# Patient Record
Sex: FEMALE | Race: WHITE | Employment: OTHER | URBAN - METROPOLITAN AREA
[De-identification: names, ages, dates, MRNs, and addresses within clinical notes are randomized per-mention and may not be internally consistent; named-entity substitution may affect disease eponyms.]

---

## 2020-03-12 ENCOUNTER — HOSPITAL ENCOUNTER (EMERGENCY)
Facility: HOSPITAL | Age: 65
Discharge: HOME | End: 2020-03-12
Attending: EMERGENCY MEDICINE
Payer: MEDICARE

## 2020-03-12 VITALS
OXYGEN SATURATION: 94 % | DIASTOLIC BLOOD PRESSURE: 89 MMHG | HEART RATE: 86 BPM | SYSTOLIC BLOOD PRESSURE: 152 MMHG | HEIGHT: 60 IN | TEMPERATURE: 99 F | RESPIRATION RATE: 18 BRPM | BODY MASS INDEX: 32.89 KG/M2 | WEIGHT: 167.56 LBS

## 2020-03-12 DIAGNOSIS — R89.9 ABNORMAL LABORATORY TEST: Primary | ICD-10-CM

## 2020-03-12 LAB
ANION GAP SERPL CALC-SCNC: 6 MMOL/L (ref 0–18)
BUN BLD-MCNC: 14 MG/DL (ref 7–18)
BUN/CREAT SERPL: 12 (ref 10–20)
CALCIUM BLD-MCNC: 9.5 MG/DL (ref 8.5–10.1)
CHLORIDE SERPL-SCNC: 103 MMOL/L (ref 98–112)
CO2 SERPL-SCNC: 24 MMOL/L (ref 21–32)
CREAT BLD-MCNC: 1.17 MG/DL (ref 0.55–1.02)
GLUCOSE BLD-MCNC: 116 MG/DL (ref 70–99)
OSMOLALITY SERPL CALC.SUM OF ELEC: 277 MOSM/KG (ref 275–295)
POTASSIUM SERPL-SCNC: 3.8 MMOL/L (ref 3.5–5.1)
SODIUM SERPL-SCNC: 133 MMOL/L (ref 136–145)

## 2020-03-12 PROCEDURE — 36415 COLL VENOUS BLD VENIPUNCTURE: CPT

## 2020-03-12 PROCEDURE — 99283 EMERGENCY DEPT VISIT LOW MDM: CPT

## 2020-03-12 PROCEDURE — 80048 BASIC METABOLIC PNL TOTAL CA: CPT | Performed by: EMERGENCY MEDICINE

## 2020-03-12 PROCEDURE — 84132 ASSAY OF SERUM POTASSIUM: CPT | Performed by: EMERGENCY MEDICINE

## 2020-03-12 NOTE — ED PROVIDER NOTES
Patient Seen in: [facility] Emergency Department      History   Patient presents with:  Abnormal Result    Stated Complaint: elevated potassium on outpatient labs    HPI    CHIEF COMPLAINT: Elevated potassium    HISTORY OF PRESENT ILLNESS: Patient i Vitals [03/12/20 0827]   /89   Pulse 108   Resp 18   Temp 98.7 °F (37.1 °C)   Temp src Temporal   SpO2 96 %   O2 Device None (Room air)       Current:/89   Pulse 108   Temp 98.7 °F (37.1 °C) (Temporal)   Resp 18   Ht 152.4 cm (5')   Wt 76 kg to the ER for worsening, concerning, new, changing or persisting symptoms. Patient was screened and evaluated during this visit.    I determined, within reasonable clinical confidence and prior to discharge, that an emergency medical condition was not or

## 2020-03-12 NOTE — ED INITIAL ASSESSMENT (HPI)
Patient sts had blood draw this morning and her potassium level came back elevated. Denies symptoms.

## 2021-11-02 ENCOUNTER — HOSPITAL ENCOUNTER (EMERGENCY)
Facility: HOSPITAL | Age: 66
Discharge: HOME | End: 2021-11-02
Attending: EMERGENCY MEDICINE
Payer: MEDICARE

## 2021-11-02 VITALS
OXYGEN SATURATION: 96 % | BODY MASS INDEX: 34.36 KG/M2 | HEIGHT: 60 IN | HEART RATE: 109 BPM | RESPIRATION RATE: 14 BRPM | WEIGHT: 175 LBS | SYSTOLIC BLOOD PRESSURE: 166 MMHG | DIASTOLIC BLOOD PRESSURE: 80 MMHG | TEMPERATURE: 97 F

## 2021-11-02 DIAGNOSIS — S61.216A LACERATION OF RIGHT LITTLE FINGER WITHOUT FOREIGN BODY WITHOUT DAMAGE TO NAIL, INITIAL ENCOUNTER: Primary | ICD-10-CM

## 2021-11-02 PROCEDURE — 99283 EMERGENCY DEPT VISIT LOW MDM: CPT | Performed by: EMERGENCY MEDICINE

## 2021-11-02 PROCEDURE — 90471 IMMUNIZATION ADMIN: CPT | Performed by: EMERGENCY MEDICINE

## 2021-11-02 PROCEDURE — 12001 RPR S/N/AX/GEN/TRNK 2.5CM/<: CPT | Performed by: EMERGENCY MEDICINE

## 2021-11-02 NOTE — ED PROVIDER NOTES
[facility] Emergency Services    [address] [place] [id number]    Phone:  [phone]           Maria T Bird   MRN: [id number]    Department:  [facility] Emergency Services   Date of Visit:  2/21/2017           Diagnosis     Other migraine without status migrainosus, not intractable        You were seen by Armando Ray MD.      Disclaimer     Follow-up Care:  It is your responsibility to arrange for follow-up care with your healthcare provider or as instructed. Call to get an appointment time.           Contact your doctor for follow-up appointment if not already scheduled.     Follow up with Bharti Garcia NP.    Specialty:  Nurse Practitioner    Contact information    [address] [facility] [address]  [phone]        Preventive care and screening     Your blood pressure was (!) 167/94 today. If your blood pressure is higher than 120/80, we recommend follow up with your primary care provider to obtain basic health screening, including reassessment of your blood pressure, within three months.          Medications you received while in the ED through 02/21/2017  9:49 PM     Date/Time Order Dose Route Action    02/21/2017  8:29 PM dexamethasone (DECADRON) injection 10 mg 10 mg Intravenous Given    02/21/2017  8:25 PM diphenhydrAMINE (BENADRYL) injection 25 mg 25 mg Intravenous Given    02/21/2017  8:27 PM LORazepam (ATIVAN) injection 0.5 mg 0.5 mg Intravenous Given    02/21/2017  8:31 PM haloperidol (HALDOL) 5 MG/ML injection 5 mg 5 mg Intravenous Given    02/21/2017  9:23 PM haloperidol (HALDOL) 5 MG/ML injection 5 mg 5 mg Intravenous Given    02/21/2017  9:23 PM ketorolac injection 30 mg 30 mg Intravenous Given         What to Do with Your Medications      Notice     No changes were made to your prescriptions during this visit.            Your To Do List     Future Appointments Provider Department Dept Phone    2/22/2017 12:45 PM [facility] MRI [facility] - MRI Scan [phone]    1. No dietary  Patient Seen in: [facility] Emergency Department      History   Patient presents with:  Laceration/Abrasion    Stated Complaint: laceration to right 5th digit    Subjective:   HPI    Is a pleasant 24-year-old female coming with complaints laceration Clinical Impression:  Laceration of right little finger without foreign body without damage to nail, initial encounter  (primary encounter diagnosis)     Disposition:  Discharge  11/2/2021  7:06 pm    Follow-up:  No follow-up provider specified. restrictions. 2. If patient hasn't had a creatinine 30 days prior and answered yes to scheduling creatinine questions - will need one. If order in chart, direct patient to have lab work at least 1 day prior to exam. 3. You will be asked to remove all eye makeup, hair pins, barrettes, jewelry, watches, eyeglasses, dentures/partials, billfold and all metal before entering the scan room. 4. Wear clothing that has no metal on it. You may be asked to put on gown. 5. Patients taking or being given oral sedation MUST be NPO for 4 hours prior to exam and have someone to take them home. Let them know they may have to stay 1 hours after exam for observation 6.  Tell patient to arrive 15 minutes prior to exam for registration. 7. Read to patient exam date and time to confirm and name and address of site being scheduled at - Ask if they need directions to site.    2/27/2017 11:00 AM [facility] IV THERAPY CHAIR 3 [facility] Intravenous Therapy Services [phone]        3/7/2017 10:00 AM [facility] [phone]    3/14/2017 10:40 AM Bharti Garcia NP [facility] Internal Medicine Pod B [phone]    3/14/2017 11:30 AM [facility] IV THERAPY CHAIR 3 [facility] [phone]        3/28/2017 11:00 AM [facility] IV THERAPY CHAIR 3 [facility] [phone]        4/17/2017 10:00 AM Ancelmo Al NP [facility] Pulmonology [phone]    10/27/2017 9:15 AM [facility] MAMMO 302 [facility] Mammography [place] [address] [phone]    1. If you are pre-menopausal, schedule your screening mammogram the week after your menstrual period  when your breast are less tender.   2. If you have had previous mammograms at another facility, please either bring those mammograms with you on the day of your exam or check that the images have been mailed to the [facility] where your breast imaging exam is scheduled.  3. If your  doctor gave you a written order for your mammogram, please bring it with you to your appointment.  4. Do not use any creams containing zinc oxide on the chest area for 3 weeks before your appointment.  5. Do not put deodorant, talcum powder, perfume or lotion under your arms or on your breasts on the day of your exam.  6. On the day of your exam wear loose, comfortable two-piece clothing.  You will be asked to change into a hospital gown.  7. Please arrive 15 minutes before your scheduled appointment time.    8. Please do not bring children to your appointment unless you have someone to stay with them in the waiting area.  For safety reasons, children are not allowed in the mammography procedure room and should not be left in the waiting room unattended.  Patients of Childbearing Age: 1. If you are, or think you may be pregnant, discuss this with you physician before your exam.  At the time of your exam tell the technologist that you are, or may be pregnant.  2. If you are breastfeeding, it is best to schedule your screening mammogram for at least six months after you have stopped nursing.  Diabetic Patients: 1. If you use an insulin infusion pump you will be asked to remove the pump.  Please bring your insulin pump supplies with you to your appointment.  Patients with Breast Implants: 1.  At the time of  your exam tell the technologist that you have breast implants.  2. Routine mammographic images do not show breast tissue that is over or under a breast implant.  In order to see as much breast tissue as possible, the technologist will perform a routine screening mammogram, using minimal compression, and 2 additional images for each breast.  The additional images, called displacement views,  are taken with the implant pushed back against the chest wall and the breast pulled forward.  3. According to the [employer], very rarely, mammograms can cause an implant to rupture.  If you are concerned about   implant rupture,  discuss this with you physician or surgeon before your mammogram.      10/27/2017 9:30 AM Trish Jackson MD [facility] [phone]    11/20/2017 11:00 AM LEAH Treadwell [facility] Audiology [phone]    Patient needs to register in the Audiology department, 15 minutes prior to the appointment.      Procedures and tests performed during your visit     Basic Metabolic Panel    C Reactive Protein    CBC & Auto Differential    Carbon Monoxide    Sedimentation Rate Westergren      Procedures     None      Imaging Results     None        Discharge Instructions       Follow up with PCP as needed.    Discharge References/Attachments     None      Medication Safety: What you need to know     Maintain Security - It is important to keep all medications in a secure location:  Keep out of the reach of children and pets    Consider using a lock box or locked filing cabinet    Place pill bottles in private area such as bedroom or drawer    Don't Share - It is illegal to share your prescription medication, even with family:  The doctor prescribes medications specifically for you and your body    You cannot be sure how the drug may affect others physically or emotionally    It is a criminal offense to share prescriptions    Proper Disposal - It is no longer acceptable to flush or throw away medications:  Recent studies show measurable amounts of medication have been found in drinking water and wildlife due to flushing or throwing away medications    Medication strength changes over time and is not typically safe after one year    Proper disposal removes the medication from your home in a safe way so that others don't have access to it. Use your local drug drop site.    Your local pharmacy can provide information on medication disposal options in your community. The Department [employer] Drug Enforcement Administration website also has information on safe  medication disposal:  www.deadiversion.[facility].gov/drug_disposal/index.html

## (undated) NOTE — ED AVS SNAPSHOT
Tiera Justice   MRN: [id number]    Department:  [facility] Emergency Department   Date of Visit:  3/12/2020           Disclosure     Insurance plans vary and the physician(s) referred by the ER may not be covered by your plan.  Please contact you tell this physician (or your personal doctor if your instructions are to return to your personal doctor) about any new or lasting problems. The primary care or specialist physician will see patients referred from the [facility] Emergency Department.  Monie Escobedo